# Patient Record
Sex: FEMALE | Race: OTHER | Employment: FULL TIME | URBAN - METROPOLITAN AREA
[De-identification: names, ages, dates, MRNs, and addresses within clinical notes are randomized per-mention and may not be internally consistent; named-entity substitution may affect disease eponyms.]

---

## 2023-03-10 ENCOUNTER — TELEPHONE (OUTPATIENT)
Dept: PULMONOLOGY | Age: 38
End: 2023-03-10

## 2023-03-20 ENCOUNTER — OFFICE VISIT (OUTPATIENT)
Dept: PULMONOLOGY | Age: 38
End: 2023-03-20
Payer: COMMERCIAL

## 2023-03-20 VITALS
DIASTOLIC BLOOD PRESSURE: 88 MMHG | HEART RATE: 84 BPM | SYSTOLIC BLOOD PRESSURE: 146 MMHG | BODY MASS INDEX: 46.82 KG/M2 | OXYGEN SATURATION: 98 % | HEIGHT: 65 IN | WEIGHT: 281 LBS

## 2023-03-20 DIAGNOSIS — E66.01 CLASS 3 SEVERE OBESITY WITHOUT SERIOUS COMORBIDITY WITH BODY MASS INDEX (BMI) OF 40.0 TO 44.9 IN ADULT, UNSPECIFIED OBESITY TYPE (HCC): ICD-10-CM

## 2023-03-20 DIAGNOSIS — R06.83 SNORING: ICD-10-CM

## 2023-03-20 DIAGNOSIS — G47.10 HYPERSOMNIA: Primary | ICD-10-CM

## 2023-03-20 PROCEDURE — 4004F PT TOBACCO SCREEN RCVD TLK: CPT | Performed by: STUDENT IN AN ORGANIZED HEALTH CARE EDUCATION/TRAINING PROGRAM

## 2023-03-20 PROCEDURE — G8427 DOCREV CUR MEDS BY ELIG CLIN: HCPCS | Performed by: STUDENT IN AN ORGANIZED HEALTH CARE EDUCATION/TRAINING PROGRAM

## 2023-03-20 PROCEDURE — G8484 FLU IMMUNIZE NO ADMIN: HCPCS | Performed by: STUDENT IN AN ORGANIZED HEALTH CARE EDUCATION/TRAINING PROGRAM

## 2023-03-20 PROCEDURE — 99204 OFFICE O/P NEW MOD 45 MIN: CPT | Performed by: STUDENT IN AN ORGANIZED HEALTH CARE EDUCATION/TRAINING PROGRAM

## 2023-03-20 PROCEDURE — G8417 CALC BMI ABV UP PARAM F/U: HCPCS | Performed by: STUDENT IN AN ORGANIZED HEALTH CARE EDUCATION/TRAINING PROGRAM

## 2023-03-20 ASSESSMENT — SLEEP AND FATIGUE QUESTIONNAIRES
HOW LIKELY ARE YOU TO NOD OFF OR FALL ASLEEP WHEN YOU ARE A PASSENGER IN A CAR FOR AN HOUR WITHOUT A BREAK: 3
HOW LIKELY ARE YOU TO NOD OFF OR FALL ASLEEP WHILE SITTING QUIETLY AFTER LUNCH WITHOUT ALCOHOL: 2
HOW LIKELY ARE YOU TO NOD OFF OR FALL ASLEEP WHILE SITTING AND READING: 1
ESS TOTAL SCORE: 9
HOW LIKELY ARE YOU TO NOD OFF OR FALL ASLEEP WHILE LYING DOWN TO REST IN THE AFTERNOON WHEN CIRCUMSTANCES PERMIT: 3
HOW LIKELY ARE YOU TO NOD OFF OR FALL ASLEEP WHILE WATCHING TV: 0
NECK CIRCUMFERENCE (INCHES): 18
HOW LIKELY ARE YOU TO NOD OFF OR FALL ASLEEP WHILE SITTING AND TALKING TO SOMEONE: 0
HOW LIKELY ARE YOU TO NOD OFF OR FALL ASLEEP WHILE SITTING INACTIVE IN A PUBLIC PLACE: 0
HOW LIKELY ARE YOU TO NOD OFF OR FALL ASLEEP IN A CAR, WHILE STOPPED FOR A FEW MINUTES IN TRAFFIC: 0

## 2023-03-20 NOTE — PROGRESS NOTES
[address]  Sleep Medicine  [address] [facility]  Chief Complaint   Patient presents with    Insomnia    Shortness of Breath     3 times a week         Gabriel Perez is a 34 y.o. female who comes in for sleep evaluation. Her complaints include sleep disruption. Onset of symptoms was 1 year ago. Pertinent PMHx includes: hypothyroidism, morbid obesity. She goes to bed at 12 midnight. She falls asleep in 30 minutes. She awakens at 4-5 am.    She awakens 1 times per night. She does use medication for sleep: melatonin 5 mg. She does take daytime naps lasting 1-2 hours. She describes the symptoms as daytime sleepiness, fatigue, loud snoring, disrupted sleep, difficulty falling asleep, falling asleep during idle situations, and difficulty staying asleep. She denies witnessed apneas and waking up in the middle of the night gasping for air. She also reports recent significant weight gain (about 50 lbs in the past 4 years). She has not fallen asleep while driving. She does not have symptoms that fulfill the criteria for restless legs syndrome. Previous evaluation and treatment has included: none.     Family hx of sleep disorders: none  Caffeinated drinks/day: 1 cup of coffee   Alcohol drinks/day/week: none  Occupation: usually works at [employer], currently unemployed      DATA REVIEWED TODAY:  [place] & Insomnia Severity Index scores  Sleep Medicine 3/20/2023   Sitting and reading 1   Watching TV 0   Sitting, inactive in a public place (e.g. a theatre or a meeting) 0   As a passenger in a car for an hour without a break 3   Lying down to rest in the afternoon when circumstances permit 3   Sitting and talking to someone 0   Sitting quietly after a lunch without alcohol 2   In a car, while stopped for a few minutes in traffic 0   [place]ster Sleepiness Score 9   Neck circumference (Inches) 18     Insomnia Severity Index (COV) 3/20/2023   Difficulty falling asleep 2   Difficulty

## 2023-03-20 NOTE — PATIENT INSTRUCTIONS
Patient information on the evaluation procedure for sleep apnea: You will have a sleep study scheduled to see if you have obstructive sleep apnea. The sleep office will call you with the results a week after the study. If the study shows that you have obstructive sleep apnea, you will be told of the next step in your care. Commonly the recommended treatment is CPAP Therapy. If you agree to this therapy and you receive your CPAP before your next appointment, please bring it with you to your follow-up appointment. Patients who have obstructive sleep apnea on the sleep study and have chosen to try CPAP:  A home equipment company will contact you to set you up with a CPAP machine and fit you for a mask. Please bring your CPAP, the mask and all supplies including the electrical cord with you to all your follow up appointments with the sleep physician    Please keep trying to use your CPAP until you have seen in the sleep office in follow up as a lot of the problems patients have with CPAP can be addressed and corrected by your sleep provider. Sleep center contact information:  [facility]: [phone]) [facility]: [phone]         Please bring your CPAP equipment and smart card to all your sleep appointments. What are the risk factors for Obstructive Sleep Apnea (LEILA)? Obesity  Snoring  Daytime sleepiness  Increasing age  Male gender  Taking sedating medications  Alcohol use  Hypertension  Stroke  Diabetes  Smoking     What is LEILA? People with LEILA experience recurrent episodes during sleep when their throat closes or significantly narrows and they cannot inhale air into their lungs.  This happens because the muscles that normally hold the throat open during wakefulness relax during sleep and allow it to narrow        When the muscles relax too much, trying to inhale can cause the throat to completely close and air cannot pass at all for at least 10

## 2023-03-22 ENCOUNTER — TELEPHONE (OUTPATIENT)
Dept: SLEEP CENTER | Age: 38
End: 2023-03-22

## 2023-03-22 NOTE — TELEPHONE ENCOUNTER
Called to schedule a psg per Rocael-Estela Gloria is full -could not leave a Anmol Prieto    Will try again later

## 2023-06-21 ENCOUNTER — HOSPITAL ENCOUNTER (OUTPATIENT)
Dept: SLEEP CENTER | Age: 38
Discharge: HOME | End: 2023-06-21
Payer: COMMERCIAL

## 2023-06-21 DIAGNOSIS — E66.01 CLASS 3 SEVERE OBESITY WITHOUT SERIOUS COMORBIDITY WITH BODY MASS INDEX (BMI) OF 40.0 TO 44.9 IN ADULT, UNSPECIFIED OBESITY TYPE (HCC): ICD-10-CM

## 2023-06-21 DIAGNOSIS — G47.10 HYPERSOMNIA: ICD-10-CM

## 2023-06-21 DIAGNOSIS — R06.83 SNORING: ICD-10-CM

## 2023-06-21 PROCEDURE — 95810 POLYSOM 6/> YRS 4/> PARAM: CPT

## 2023-06-24 ENCOUNTER — TELEPHONE (OUTPATIENT)
Dept: PULMONOLOGY | Age: 38
End: 2023-06-24

## 2023-06-26 PROBLEM — G47.10 HYPERSOMNIA: Status: ACTIVE | Noted: 2023-06-26

## 2023-07-14 ENCOUNTER — TELEPHONE (OUTPATIENT)
Dept: PULMONOLOGY | Age: 38
End: 2023-07-14

## 2023-07-14 DIAGNOSIS — G47.33 OSA (OBSTRUCTIVE SLEEP APNEA): Primary | ICD-10-CM

## 2023-07-14 NOTE — TELEPHONE ENCOUNTER
Pt sent message in the appointment pool asking for a follow up appointment to discuss sleep study results, does patient still need follow up or should she schedule CPAP titration study first?

## 2023-07-17 ENCOUNTER — TELEPHONE (OUTPATIENT)
Dept: SLEEP CENTER | Age: 38
End: 2023-07-17

## 2023-07-17 NOTE — TELEPHONE ENCOUNTER
Called to schedule a CPAP per Juan C Butler  for the pt to return my call     [facility] insurance    [facility]

## 2023-08-16 ENCOUNTER — HOSPITAL ENCOUNTER (OUTPATIENT)
Dept: SLEEP CENTER | Age: 38
Discharge: HOME | End: 2023-08-16
Payer: COMMERCIAL

## 2023-08-16 DIAGNOSIS — G47.33 OSA (OBSTRUCTIVE SLEEP APNEA): ICD-10-CM

## 2023-08-16 PROCEDURE — 95811 POLYSOM 6/>YRS CPAP 4/> PARM: CPT

## 2023-08-17 ENCOUNTER — TELEPHONE (OUTPATIENT)
Dept: PULMONOLOGY | Age: 38
End: 2023-08-17

## 2023-08-17 NOTE — PROGRESS NOTES
Diagnosis: [x] LEILA  (G47.33) [] CSA (G47.31) []  Other:__________________   Length of Need: [] 13 months [x]  99 Months                                          []  Other:__________________   Machine (DONAVON): [] Respironics Auto (with modem for remote monitoring)       [] Other:____________________    [x]  ResMed Auto (with modem for remote monitoring)    [x]  CPAP () [] Bilevel ()   Mode: Mode:   [x] Auto [] Fixed [] Auto [] Spontaneous   Pmin:_____5____cmH2O      Pmax:_____15____cmH2O   P:_________cmH2O    EPAPmin:__________cmH2O IPAP:__________cmH2O     IPAPmax:__________cmH2O EPAP:__________cmH2O     PSmin:_______  PSmax:_______       (ResMed) PS:_________     Flex/EPR - 3 full time                          Ramp time: 30 min Flex/EPR - 3 full time                 Ramp time: 30 min   Ramp Pressure:_____4______cmH2O Ramp Pressure:____________cmH2O         Humidifier: [x] Heated ()                                               [] Passive     [x] Water chamber replacement ()/ 1 per 6 months   Mask:   [x] Nasal () /1 per 3 months [x] Full Face () /1 per 3 months   [x] Patient choice -Size and fit mask [x] Patient Choice - Size and fit mask   [x] Dispense: small airfit P10 [] Dispense_____________________________________   [x] Headgear () / 1 per 3 months [x] Headgear () / 1 per 3 months   [x] Replacement Nasal Cushion ()/2 per month [x] Interface Replacement ()/1 per month   [x] Replacement Nasal Pillows ()/2 per month       Tubing: [x] Heated ()/1 per 3 months                           [] Standard ()/1 per 3 months  [] Other:____________________   Filters: [x] Non-disposable ()/1 per 6 months                 [x] Ultra-Fine, Disposable ()/2 per month     Miscellaneous: [] Chin Strap ()/ 1 per 6months      [] Other:__________________________________         Start Order Date: 08/17/23    MEDICAL JUSTIFICATION:  I, the undersigned, certify

## 2023-08-17 NOTE — TELEPHONE ENCOUNTER
Please inform patient based on the recent titration study, we will proceed with treating her obstructive sleep apnea with auto CPAP. I will order a PAP device via a durable medical equipment company of their choice (if no preference, we will go with [employer]) and they will reach out to keep her updated on the process. This will be the company that is going to work with her insurance and provide her the PAP device, along with replacing the mask and other supplies going forward. Please let them also know that if they are not satisfied with their service, they can change DME companies at any time. If they have any questions, they can let you know or message me via [employer]. If patient agrees with plan, please route the PAP order to DME company (order already completed on a separate order encounter). Patient should be scheduled for 60-90 days follow up.     Thanks  Edelmira Schroeder MD

## 2023-08-18 PROBLEM — G47.33 OSA (OBSTRUCTIVE SLEEP APNEA): Status: ACTIVE | Noted: 2023-08-18

## 2023-08-18 PROCEDURE — 95811 POLYSOM 6/>YRS CPAP 4/> PARM: CPT | Performed by: STUDENT IN AN ORGANIZED HEALTH CARE EDUCATION/TRAINING PROGRAM

## 2023-08-28 NOTE — TELEPHONE ENCOUNTER
Called patient ( 3X times) to go over sleep study results with  , no answer , left vm for patient to call office back. [employer] message sent to patient, will send letter.

## 2024-05-17 ENCOUNTER — TELEPHONE (OUTPATIENT)
Dept: PULMONOLOGY | Age: 39
End: 2024-05-17